# Patient Record
Sex: MALE | Race: OTHER | HISPANIC OR LATINO | URBAN - METROPOLITAN AREA
[De-identification: names, ages, dates, MRNs, and addresses within clinical notes are randomized per-mention and may not be internally consistent; named-entity substitution may affect disease eponyms.]

---
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
## 2025-02-10 NOTE — ED PROVIDER NOTE - NSCAREINITIATED _GEN_ER
Mercedes has a personal history of blood clots and a family history of hypercoagulability. This could have been an unprovoked PE, based on patient reports.   -I did a fall risk assessment of patient, and he is not high risk.  -Continue Xarelto.   Jessica Webb(Attending)

## 2025-04-21 ENCOUNTER — EMERGENCY (EMERGENCY)
Facility: HOSPITAL | Age: 20
LOS: 0 days | Discharge: HOME | End: 2025-04-21
Attending: STUDENT IN AN ORGANIZED HEALTH CARE EDUCATION/TRAINING PROGRAM
Payer: COMMERCIAL

## 2025-04-21 VITALS
HEIGHT: 69 IN | OXYGEN SATURATION: 99 % | WEIGHT: 160.06 LBS | DIASTOLIC BLOOD PRESSURE: 85 MMHG | RESPIRATION RATE: 18 BRPM | HEART RATE: 98 BPM | SYSTOLIC BLOOD PRESSURE: 135 MMHG | TEMPERATURE: 98 F

## 2025-04-21 DIAGNOSIS — R07.89 OTHER CHEST PAIN: ICD-10-CM

## 2025-04-21 PROCEDURE — 71046 X-RAY EXAM CHEST 2 VIEWS: CPT | Mod: 26

## 2025-04-21 PROCEDURE — 99284 EMERGENCY DEPT VISIT MOD MDM: CPT

## 2025-04-21 PROCEDURE — 93010 ELECTROCARDIOGRAM REPORT: CPT

## 2025-04-21 PROCEDURE — 71046 X-RAY EXAM CHEST 2 VIEWS: CPT

## 2025-04-21 PROCEDURE — 93005 ELECTROCARDIOGRAM TRACING: CPT

## 2025-04-21 PROCEDURE — 99283 EMERGENCY DEPT VISIT LOW MDM: CPT | Mod: 25

## 2025-04-21 RX ORDER — ACETAMINOPHEN 500 MG/5ML
650 LIQUID (ML) ORAL ONCE
Refills: 0 | Status: COMPLETED | OUTPATIENT
Start: 2025-04-21 | End: 2025-04-21

## 2025-04-21 RX ORDER — IBUPROFEN 200 MG
600 TABLET ORAL ONCE
Refills: 0 | Status: COMPLETED | OUTPATIENT
Start: 2025-04-21 | End: 2025-04-21

## 2025-04-21 RX ADMIN — Medication 600 MILLIGRAM(S): at 22:10

## 2025-04-21 RX ADMIN — Medication 650 MILLIGRAM(S): at 22:10

## 2025-04-21 NOTE — ED PROVIDER NOTE - WET READ LAUNCH FT
Needs Scheduling      Authorized by Sofia Gracia CNM   Diagnosis: Colon cancer screening         There are no Wet Read(s) to document.

## 2025-04-21 NOTE — ED PROVIDER NOTE - PATIENT PORTAL LINK FT
You can access the FollowMyHealth Patient Portal offered by [facility] by registering at the following website: [email or web address]. By joining [employer]’s FollowMyHealth portal, you will also be able to view your health information using other applications (apps) compatible with our system.

## 2025-04-21 NOTE — ED PROVIDER NOTE - NSFOLLOWUPINSTRUCTIONS_ED_ALL_ED_FT
Take Tylenol and Motrin as needed for pain. Follow up with your pediatrician.     Chest Pain    Chest pain can be caused by many different conditions which may or may not be dangerous. Causes include heartburn, lung infections, heart attack, blood clot in lungs, skin infections, strain or damage to muscle, cartilage, or bones, etc. In addition to a history and physical examination, an electrocardiogram (ECG) or other lab tests may have been performed to determine the cause of your chest pain. Follow up with your primary care provider or with a cardiologist as instructed.     SEEK IMMEDIATE MEDICAL CARE IF YOU HAVE ANY OF THE FOLLOWING SYMPTOMS: worsening chest pain, coughing up blood, unexplained back/neck/jaw pain, severe abdominal pain, dizziness or lightheadedness, fainting, shortness of breath, sweaty or clammy skin, vomiting, or racing heart beat. These symptoms may represent a serious problem that is an emergency. Do not wait to see if the symptoms will go away. Get medical help right away. Call 911 and do not drive yourself to the hospital.

## 2025-04-21 NOTE — ED ADULT TRIAGE NOTE - WEIGHT IN LBS
Abdomen , soft, nontender, nondistended , no guarding or rigidity , no masses palpable , normal bowel sounds , Liver and Spleen , no hepatomegaly present , no hepatosplenomegaly , liver nontender , spleen not palpable
160

## 2025-04-21 NOTE — ED PROVIDER NOTE - PHYSICAL EXAMINATION
General: Awake, alert, NAD.  HEENT: NCAT, PERRL, EOMI, conjunctiva and sclera clear, no nasal congestion, moist mucous membranes, oropharynx without erythema or exudates  RESP: CTAB, no wheezes, no increased work of breathing, no tachypnea, no retractions, no nasal flaring.  CVS: RRR, S1 S2, no extra heart sounds, no murmurs, cap refill <2 sec, 2+ peripheral pulses.  ABD: (+) BS, soft, NTND.  MSK: FROM in all extremities, no tenderness, no deformities.  Skin: Warm, dry, well-perfused, no rashes, no lesions.

## 2025-04-21 NOTE — ED PROVIDER NOTE - CCCP TRG CHIEF CMPLNT
Please take your Percocet as prescribed for pain    Please make a follow-up appointment with your physician in 2 days for recheck exam  chest soreness

## 2025-04-21 NOTE — ED PROVIDER NOTE - OBJECTIVE STATEMENT
20-year-old male with a history of hypercholesterol presents today with right sided chest pain.  Patient states that since yesterday he has been having  consistent chest pain that gets worse when he is laying down and when he takes a deep breath.  Denies nausea, vomiting, diarrhea, fever, cough, runny nose.  Denies any trauma to the area.  Patient does not exercise or playing sports.  Patient denies any numbness or tingling in his arms.  Patient took ibuprofen this morning and again at 7:30 PM today. Mother has HTN and hypercholesterol and father has the same.

## 2025-04-21 NOTE — ED PROVIDER NOTE - CLINICAL SUMMARY MEDICAL DECISION MAKING FREE TEXT BOX
19 y/o M presenting with chest pain. Says his cholesterol may have been high at last pediatrician visit. Differential includes WPW, brugada, HOCM, ARVD, SVT, aortic dissection, endocarditis, myocarditis, pericarditis, GERD & musculoskeletal pain among others. Vitals are normal and the patient is clinically well appearing. No high risk features of chest pain such as: chest pain with exertion, chest pain with associated syncope, marfanoid body habitus, recent strep pharyngitis infection or family history of sudden cardiac death in relatives at a young age. Exam without murmurs, pericardial rub or signs of CHF. EKG is  normal sinus rhythm, normal OR, qRS, QTc intervals, normal axis, normal ST-T wave intervals. There is no evidence of complete/incomplete bundle branch blocks, ST elevation, delta wave, epsilon wave, dagger-like Q-waves or SVT. CXR negative for pneumonia, pneumothorax, pleural effusion or widened mediastinum. Patient will be discharged home with PMD. Low risk for PE via wells, PERC negative. No recent illness to suggest perimyocarditis, nonpositional (hurts when he moves ribs, but not related to being supine or not).    All questions answered. Pt and mother verbalized understanding of the plan. HTN noted, advised repeat with pediatrician. 21 y/o M presenting with chest pain. Says his cholesterol may have been high at last pediatrician visit. Differential includes WPW, brugada, HOCM, ARVD, SVT, aortic dissection, endocarditis, myocarditis, pericarditis, GERD & musculoskeletal pain among others. Vitals are normal and the patient is clinically well appearing. No high risk features of chest pain such as: chest pain with exertion, chest pain with associated syncope, marfanoid body habitus, recent strep pharyngitis infection or family history of sudden cardiac death in relatives at a young age. Exam without murmurs, pericardial rub or signs of CHF. EKG is  normal sinus rhythm, normal OK, qRS, QTc intervals, normal axis, normal ST-T wave intervals. There is no evidence of complete/incomplete bundle branch blocks, ST elevation, delta wave, epsilon wave, dagger-like Q-waves or SVT. CXR negative for pneumonia, pneumothorax, pleural effusion or widened mediastinum.     When I palpate his R anterior chest, it causes facial wincing and pt says "that's the pain".     Patient will be discharged home with PMD. Low risk for PE via wells, PERC negative. No recent illness to suggest perimyocarditis, nonpositional (hurts when he moves ribs, but not related to being supine or not).    All questions answered. Pt and mother verbalized understanding of the plan. HTN noted, advised repeat with pediatrician.

## 2025-04-22 PROBLEM — Z78.9 OTHER SPECIFIED HEALTH STATUS: Chronic | Status: ACTIVE | Noted: 2022-04-06

## 2025-09-19 ENCOUNTER — NON-APPOINTMENT (OUTPATIENT)
Age: 20
End: 2025-09-19